# Patient Record
Sex: MALE
[De-identification: names, ages, dates, MRNs, and addresses within clinical notes are randomized per-mention and may not be internally consistent; named-entity substitution may affect disease eponyms.]

---

## 2024-02-07 PROBLEM — Z00.00 ENCOUNTER FOR PREVENTIVE HEALTH EXAMINATION: Status: ACTIVE | Noted: 2024-02-07

## 2024-02-09 ENCOUNTER — APPOINTMENT (OUTPATIENT)
Dept: ORTHOPEDIC SURGERY | Facility: CLINIC | Age: 59
End: 2024-02-09
Payer: COMMERCIAL

## 2024-02-09 VITALS — WEIGHT: 190 LBS | BODY MASS INDEX: 25.73 KG/M2 | HEIGHT: 72 IN

## 2024-02-09 DIAGNOSIS — Z78.9 OTHER SPECIFIED HEALTH STATUS: ICD-10-CM

## 2024-02-09 DIAGNOSIS — M77.8 OTHER ENTHESOPATHIES, NOT ELSEWHERE CLASSIFIED: ICD-10-CM

## 2024-02-09 DIAGNOSIS — Z87.898 PERSONAL HISTORY OF OTHER SPECIFIED CONDITIONS: ICD-10-CM

## 2024-02-09 PROCEDURE — 20611 DRAIN/INJ JOINT/BURSA W/US: CPT | Mod: RT

## 2024-02-09 PROCEDURE — 73030 X-RAY EXAM OF SHOULDER: CPT | Mod: 50

## 2024-02-09 PROCEDURE — 99204 OFFICE O/P NEW MOD 45 MIN: CPT | Mod: 25

## 2024-02-09 PROCEDURE — J3490M: CUSTOM

## 2024-02-09 RX ORDER — MELOXICAM 15 MG/1
15 TABLET ORAL
Qty: 30 | Refills: 0 | Status: ACTIVE | COMMUNITY
Start: 2024-02-09 | End: 1900-01-01

## 2024-02-09 RX ORDER — FAMOTIDINE 10 MG
TABLET,CHEWABLE ORAL
Refills: 0 | Status: ACTIVE | COMMUNITY

## 2024-02-09 NOTE — PROCEDURE
[Large Joint Injection] : Large joint injection [Right] : of the right [Shoulder] : shoulder [] : Patient tolerated procedure well [Call if redness, pain or fever occur] : call if redness, pain or fever occur [Apply ice for 15min out of every hour for the next 12-24 hours as tolerated] : apply ice for 15 minutes out of every hour for the next 12-24 hours as tolerated [Patient was advised to rest the joint(s) for ____ days] : patient was advised to rest the joint(s) for [unfilled] days [All ultrasound images have been permanently captured and stored accordingly in our picture archiving and communication system] : All ultrasound images have been permanently captured and stored accordingly in our picture archiving and communication system [Pain] : pain [Inflammation] : inflammation [FreeTextEntry3] : Large Joint Injection / Aspiration: Celestone, Lidocaine, Marcaine and Guidance Ultrasound Large Joint Injection was performed because of pain and inflammation. Anesthesia: ethyl chloride sprayed topically..  Celestone: An injection of Celestone 12 mg , 2 cc. Needle size: 22 gauge , 1.5 inch.  Lidocaine: 3 cc.  Marcaine: 3 cc.   Medication was injected in the right shoulder. Patient has tried OTC's including aspirin, Ibuprofen, Aleve etc or prescription NSAIDS, and/or exercises at home and/ or physical therapy without satisfactory response. After verbal consent using sterile preparation and technique. The risks, benefits, and alternatives to cortisone injection were explained in full to the patient. Risks outlined include but are not limited to infection, sepsis, bleeding, scarring, skin discoloration, temporary increase in pain, syncopal episode, failure to resolve symptoms, allergic reaction, symptom recurrence, and elevation of blood sugar in diabetics. Patient understood the risks. All questions were answered. After discussion of options, patient requested an injection. Oral informed consent was obtained and sterile prep was done of the injection site. Sterile technique was utilized for the procedure including the preparation of the solutions used for the injection. Patient tolerated the procedure well. Advised to ice the injection site this evening. Prep with betadine locally to site. Sterile technique used. Patient tolerated procedure well. Post Procedure Instructions: Patient was advised to call if redness, pain, or fever occur and apply ice for 15 min. out of every hour for the next 12-24 hours as tolerated. patient was advised to rest the joint(s) for 1 days.   Ultrasound Guidance was used for the following reasons: for Glenohumeral injection.   Ultrasound guided injection was performed of the shoulder, visualization of the needle and placement of injection was performed without complication.

## 2024-02-09 NOTE — HISTORY OF PRESENT ILLNESS
[Left Arm] : left arm [Right Arm] : right arm [Gradual] : gradual [Radiating] : radiating [Tightness] : tightness [Work] : work [Part time] : Work status: part time [de-identified] : he is RHD, had old issue in 2017 and dealt with it and feels tightness, certain motions make him feel worse, on no meds, he has Tourette's [] : no [FreeTextEntry5] : pulled rt  shoulder and has been seen in the past , noticed alot of tightness [FreeTextEntry9] : light weight training /stretching [de-identified] : 12/2020 [de-identified] : Dr. Shane for rt shoulder [de-identified] : [facility]

## 2024-02-09 NOTE — DISCUSSION/SUMMARY
[de-identified] : modify activities  try OTC meds ice as needed try topical lidocaine reviewed current medications used by this patient

## 2024-02-09 NOTE — PHYSICAL EXAM
[] : motor and sensory intact distally [There are no fractures, subluxations or dislocations. No significant abnormalities are seen] : There are no fractures, subluxations or dislocations. No significant abnormalities are seen [Bilateral] : shoulder bilaterally [FreeTextEntry3] : pop eye on R only